# Patient Record
Sex: FEMALE | Race: WHITE | HISPANIC OR LATINO | Employment: UNEMPLOYED | URBAN - METROPOLITAN AREA
[De-identification: names, ages, dates, MRNs, and addresses within clinical notes are randomized per-mention and may not be internally consistent; named-entity substitution may affect disease eponyms.]

---

## 2018-12-16 ENCOUNTER — HOSPITAL ENCOUNTER (OUTPATIENT)
Facility: MEDICAL CENTER | Age: 23
End: 2018-12-18
Attending: EMERGENCY MEDICINE | Admitting: HOSPITALIST
Payer: COMMERCIAL

## 2018-12-16 DIAGNOSIS — R45.851 SUICIDAL IDEATION: ICD-10-CM

## 2018-12-16 DIAGNOSIS — T07.XXXA MULTIPLE LACERATIONS: ICD-10-CM

## 2018-12-16 DIAGNOSIS — F50.00 ANOREXIA NERVOSA: ICD-10-CM

## 2018-12-16 DIAGNOSIS — T14.91XA SUICIDE ATTEMPT (HCC): ICD-10-CM

## 2018-12-16 DIAGNOSIS — S41.112A LACERATION OF MULTIPLE SITES OF LEFT UPPER EXTREMITY, INITIAL ENCOUNTER: ICD-10-CM

## 2018-12-16 LAB
ALBUMIN SERPL BCP-MCNC: 4.4 G/DL (ref 3.2–4.9)
ALBUMIN/GLOB SERPL: 1.9 G/DL
ALP SERPL-CCNC: 68 U/L (ref 30–99)
ALT SERPL-CCNC: 8 U/L (ref 2–50)
ANION GAP SERPL CALC-SCNC: 7 MMOL/L (ref 0–11.9)
APAP SERPL-MCNC: <10 UG/ML (ref 10–30)
AST SERPL-CCNC: 15 U/L (ref 12–45)
BASOPHILS # BLD AUTO: 0.6 % (ref 0–1.8)
BASOPHILS # BLD: 0.05 K/UL (ref 0–0.12)
BILIRUB SERPL-MCNC: 0.4 MG/DL (ref 0.1–1.5)
BUN SERPL-MCNC: 14 MG/DL (ref 8–22)
CALCIUM SERPL-MCNC: 8.8 MG/DL (ref 8.5–10.5)
CHLORIDE SERPL-SCNC: 108 MMOL/L (ref 96–112)
CO2 SERPL-SCNC: 23 MMOL/L (ref 20–33)
CREAT SERPL-MCNC: 0.74 MG/DL (ref 0.5–1.4)
EOSINOPHIL # BLD AUTO: 0.07 K/UL (ref 0–0.51)
EOSINOPHIL NFR BLD: 0.9 % (ref 0–6.9)
ERYTHROCYTE [DISTWIDTH] IN BLOOD BY AUTOMATED COUNT: 40.6 FL (ref 35.9–50)
ETHANOL BLD-MCNC: 0 G/DL
GLOBULIN SER CALC-MCNC: 2.3 G/DL (ref 1.9–3.5)
GLUCOSE SERPL-MCNC: 83 MG/DL (ref 65–99)
HCG SERPL QL: NEGATIVE
HCT VFR BLD AUTO: 35.9 % (ref 37–47)
HGB BLD-MCNC: 11.9 G/DL (ref 12–16)
IMM GRANULOCYTES # BLD AUTO: 0.01 K/UL (ref 0–0.11)
IMM GRANULOCYTES NFR BLD AUTO: 0.1 % (ref 0–0.9)
LYMPHOCYTES # BLD AUTO: 2.28 K/UL (ref 1–4.8)
LYMPHOCYTES NFR BLD: 28.2 % (ref 22–41)
MCH RBC QN AUTO: 28.8 PG (ref 27–33)
MCHC RBC AUTO-ENTMCNC: 33.1 G/DL (ref 33.6–35)
MCV RBC AUTO: 86.9 FL (ref 81.4–97.8)
MONOCYTES # BLD AUTO: 0.48 K/UL (ref 0–0.85)
MONOCYTES NFR BLD AUTO: 5.9 % (ref 0–13.4)
NEUTROPHILS # BLD AUTO: 5.19 K/UL (ref 2–7.15)
NEUTROPHILS NFR BLD: 64.3 % (ref 44–72)
NRBC # BLD AUTO: 0 K/UL
NRBC BLD-RTO: 0 /100 WBC
PLATELET # BLD AUTO: 231 K/UL (ref 164–446)
PMV BLD AUTO: 10.6 FL (ref 9–12.9)
POTASSIUM SERPL-SCNC: 3.4 MMOL/L (ref 3.6–5.5)
PROT SERPL-MCNC: 6.7 G/DL (ref 6–8.2)
RBC # BLD AUTO: 4.13 M/UL (ref 4.2–5.4)
SALICYLATES SERPL-MCNC: 0 MG/DL (ref 15–25)
SODIUM SERPL-SCNC: 138 MMOL/L (ref 135–145)
WBC # BLD AUTO: 8.1 K/UL (ref 4.8–10.8)

## 2018-12-16 PROCEDURE — 84703 CHORIONIC GONADOTROPIN ASSAY: CPT

## 2018-12-16 PROCEDURE — 90471 IMMUNIZATION ADMIN: CPT

## 2018-12-16 PROCEDURE — 80307 DRUG TEST PRSMV CHEM ANLYZR: CPT

## 2018-12-16 PROCEDURE — 80053 COMPREHEN METABOLIC PANEL: CPT

## 2018-12-16 PROCEDURE — 85025 COMPLETE CBC W/AUTO DIFF WBC: CPT

## 2018-12-16 PROCEDURE — 304217 HCHG IRRIGATION SYSTEM

## 2018-12-16 PROCEDURE — 96372 THER/PROPH/DIAG INJ SC/IM: CPT

## 2018-12-16 PROCEDURE — 99285 EMERGENCY DEPT VISIT HI MDM: CPT

## 2018-12-16 PROCEDURE — 700111 HCHG RX REV CODE 636 W/ 250 OVERRIDE (IP): Performed by: EMERGENCY MEDICINE

## 2018-12-16 PROCEDURE — 93005 ELECTROCARDIOGRAM TRACING: CPT | Performed by: EMERGENCY MEDICINE

## 2018-12-16 PROCEDURE — 700101 HCHG RX REV CODE 250: Performed by: EMERGENCY MEDICINE

## 2018-12-16 PROCEDURE — 90715 TDAP VACCINE 7 YRS/> IM: CPT | Performed by: EMERGENCY MEDICINE

## 2018-12-16 PROCEDURE — 36415 COLL VENOUS BLD VENIPUNCTURE: CPT

## 2018-12-16 RX ORDER — LIDOCAINE HYDROCHLORIDE AND EPINEPHRINE 10; 10 MG/ML; UG/ML
30 INJECTION, SOLUTION INFILTRATION; PERINEURAL ONCE
Status: COMPLETED | OUTPATIENT
Start: 2018-12-16 | End: 2018-12-16

## 2018-12-16 RX ORDER — HALOPERIDOL 5 MG/ML
5 INJECTION INTRAMUSCULAR ONCE
Status: COMPLETED | OUTPATIENT
Start: 2018-12-16 | End: 2018-12-16

## 2018-12-16 RX ADMIN — HALOPERIDOL LACTATE 5 MG: 5 INJECTION, SOLUTION INTRAMUSCULAR at 23:10

## 2018-12-16 RX ADMIN — CLOSTRIDIUM TETANI TOXOID ANTIGEN (FORMALDEHYDE INACTIVATED), CORYNEBACTERIUM DIPHTHERIAE TOXOID ANTIGEN (FORMALDEHYDE INACTIVATED), BORDETELLA PERTUSSIS TOXOID ANTIGEN (GLUTARALDEHYDE INACTIVATED), BORDETELLA PERTUSSIS FILAMENTOUS HEMAGGLUTININ ANTIGEN (FORMALDEHYDE INACTIVATED), BORDETELLA PERTUSSIS PERTACTIN ANTIGEN, AND BORDETELLA PERTUSSIS FIMBRIAE 2/3 ANTIGEN 0.5 ML: 5; 2; 2.5; 5; 3; 5 INJECTION, SUSPENSION INTRAMUSCULAR at 23:09

## 2018-12-16 RX ADMIN — LIDOCAINE HYDROCHLORIDE AND EPINEPHRINE 30 ML: 10; 10 INJECTION, SOLUTION INFILTRATION; PERINEURAL at 23:10

## 2018-12-16 ASSESSMENT — PAIN SCALES - GENERAL: PAINLEVEL_OUTOF10: 4

## 2018-12-17 LAB
AMPHET UR QL SCN: NEGATIVE
BARBITURATES UR QL SCN: NEGATIVE
BENZODIAZ UR QL SCN: POSITIVE
BZE UR QL SCN: NEGATIVE
CANNABINOIDS UR QL SCN: POSITIVE
METHADONE UR QL SCN: NEGATIVE
OPIATES UR QL SCN: NEGATIVE
OXYCODONE UR QL SCN: NEGATIVE
PCP UR QL SCN: NEGATIVE
PROPOXYPH UR QL SCN: NEGATIVE

## 2018-12-17 PROCEDURE — 700101 HCHG RX REV CODE 250: Performed by: EMERGENCY MEDICINE

## 2018-12-17 PROCEDURE — 700102 HCHG RX REV CODE 250 W/ 637 OVERRIDE(OP): Performed by: EMERGENCY MEDICINE

## 2018-12-17 PROCEDURE — A9270 NON-COVERED ITEM OR SERVICE: HCPCS | Performed by: EMERGENCY MEDICINE

## 2018-12-17 PROCEDURE — 90791 PSYCH DIAGNOSTIC EVALUATION: CPT

## 2018-12-17 PROCEDURE — 305000 HCHG REPAIR-SIMPLE/INTERMED LEVEL 2

## 2018-12-17 PROCEDURE — 80307 DRUG TEST PRSMV CHEM ANLYZR: CPT

## 2018-12-17 PROCEDURE — 36415 COLL VENOUS BLD VENIPUNCTURE: CPT

## 2018-12-17 PROCEDURE — 303747 HCHG EXTRA SUTURE

## 2018-12-17 RX ORDER — GABAPENTIN 100 MG/1
100 CAPSULE ORAL 3 TIMES DAILY
COMMUNITY

## 2018-12-17 RX ORDER — GABAPENTIN 100 MG/1
100 CAPSULE ORAL 3 TIMES DAILY
Status: DISCONTINUED | OUTPATIENT
Start: 2018-12-17 | End: 2018-12-18

## 2018-12-17 RX ORDER — TRAZODONE HYDROCHLORIDE 100 MG/1
100 TABLET ORAL NIGHTLY
COMMUNITY

## 2018-12-17 RX ORDER — GABAPENTIN 300 MG/1
600 CAPSULE ORAL
Status: DISCONTINUED | OUTPATIENT
Start: 2018-12-17 | End: 2018-12-18 | Stop reason: HOSPADM

## 2018-12-17 RX ORDER — CLONAZEPAM 0.5 MG/1
1 TABLET ORAL 2 TIMES DAILY PRN
Status: DISCONTINUED | OUTPATIENT
Start: 2018-12-17 | End: 2018-12-18 | Stop reason: HOSPADM

## 2018-12-17 RX ORDER — TRAZODONE HYDROCHLORIDE 50 MG/1
100 TABLET ORAL NIGHTLY
Status: DISCONTINUED | OUTPATIENT
Start: 2018-12-17 | End: 2018-12-18 | Stop reason: HOSPADM

## 2018-12-17 RX ORDER — FLUOXETINE HYDROCHLORIDE 20 MG/1
60 CAPSULE ORAL DAILY
Status: DISCONTINUED | OUTPATIENT
Start: 2018-12-17 | End: 2018-12-18 | Stop reason: HOSPADM

## 2018-12-17 RX ORDER — LURASIDONE HYDROCHLORIDE 40 MG/1
40 TABLET, FILM COATED ORAL
COMMUNITY

## 2018-12-17 RX ORDER — LAMOTRIGINE 100 MG/1
200 TABLET ORAL
Status: DISCONTINUED | OUTPATIENT
Start: 2018-12-17 | End: 2018-12-18 | Stop reason: HOSPADM

## 2018-12-17 RX ORDER — LURASIDONE HYDROCHLORIDE 40 MG/1
40 TABLET, FILM COATED ORAL
Status: DISCONTINUED | OUTPATIENT
Start: 2018-12-17 | End: 2018-12-18 | Stop reason: HOSPADM

## 2018-12-17 RX ORDER — LAMOTRIGINE 100 MG/1
200 TABLET ORAL
COMMUNITY

## 2018-12-17 RX ORDER — LIDOCAINE HYDROCHLORIDE AND EPINEPHRINE 10; 10 MG/ML; UG/ML
30 INJECTION, SOLUTION INFILTRATION; PERINEURAL ONCE
Status: COMPLETED | OUTPATIENT
Start: 2018-12-17 | End: 2018-12-17

## 2018-12-17 RX ORDER — IBUPROFEN 200 MG
400 TABLET ORAL EVERY 6 HOURS PRN
Status: DISCONTINUED | OUTPATIENT
Start: 2018-12-17 | End: 2018-12-18 | Stop reason: HOSPADM

## 2018-12-17 RX ORDER — IBUPROFEN 200 MG
400 TABLET ORAL EVERY 6 HOURS PRN
COMMUNITY

## 2018-12-17 RX ORDER — CLONAZEPAM 1 MG/1
1 TABLET ORAL 2 TIMES DAILY PRN
COMMUNITY

## 2018-12-17 RX ORDER — FLUOXETINE HYDROCHLORIDE 20 MG/1
60 CAPSULE ORAL DAILY
COMMUNITY

## 2018-12-17 RX ORDER — PRAZOSIN HYDROCHLORIDE 1 MG/1
1 CAPSULE ORAL NIGHTLY
COMMUNITY

## 2018-12-17 RX ORDER — PRAZOSIN HYDROCHLORIDE 1 MG/1
1 CAPSULE ORAL
Status: DISCONTINUED | OUTPATIENT
Start: 2018-12-17 | End: 2018-12-18 | Stop reason: HOSPADM

## 2018-12-17 RX ORDER — GABAPENTIN 600 MG/1
600 TABLET ORAL
COMMUNITY

## 2018-12-17 RX ADMIN — LURASIDONE HYDROCHLORIDE 40 MG: 40 TABLET, FILM COATED ORAL at 18:22

## 2018-12-17 RX ADMIN — LIDOCAINE HYDROCHLORIDE AND EPINEPHRINE 30 ML: 10; 10 INJECTION, SOLUTION INFILTRATION; PERINEURAL at 01:45

## 2018-12-17 RX ADMIN — GABAPENTIN 100 MG: 100 CAPSULE ORAL at 18:22

## 2018-12-17 RX ADMIN — LAMOTRIGINE 200 MG: 100 TABLET ORAL at 21:10

## 2018-12-17 RX ADMIN — IBUPROFEN 400 MG: 200 TABLET, FILM COATED ORAL at 18:27

## 2018-12-17 RX ADMIN — GABAPENTIN 600 MG: 300 CAPSULE ORAL at 21:11

## 2018-12-17 RX ADMIN — TRAZODONE HYDROCHLORIDE 100 MG: 50 TABLET ORAL at 21:10

## 2018-12-17 RX ADMIN — PRAZOSIN HYDROCHLORIDE 1 MG: 1 CAPSULE ORAL at 21:10

## 2018-12-17 RX ADMIN — FLUOXETINE HYDROCHLORIDE 60 MG: 20 CAPSULE ORAL at 18:22
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
## 2018-12-17 NOTE — ED NOTES
Report given to MARY Ponce. Restraints removed by security. Pt resting comfortably and moved to bed 34

## 2018-12-17 NOTE — ED NOTES
Patient resting quietly in bed with eyes closed without distress, no apparent needs at this time.  Good chest rise and fall.  Sitter outside of room watching patient

## 2018-12-17 NOTE — ED NOTES
Total of 9 belongings bags. Place in right upper side on top of lockers. All patients person items will not fit in a locker.

## 2018-12-17 NOTE — ED NOTES
Patient moved to [place] [id number] at this time.  Report received from Shanelle HERNANDEZ.  Assuming care at this time.  Patient removed from final restraint.

## 2018-12-17 NOTE — ED NOTES
Received report and assumed care of pt. Pt sleeping at this time. 1:1 sitter monitoring pt directly for safety.
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
## 2018-12-17 NOTE — ED NOTES
Pt restraints were ordered upon arrival and placed at 23:00 per Doctor. Pt was monitored per flowchart from start to discontinue of restraints. Initially, pt started with two points but when  and tech took the pt's pants off the ankle restraint was never put back on. Pt tolerated wrist restraint but was intermittently agitated throughout the entire time of her structures being put in to the multiple lacerations she had. Restraint were eventually taken off by security after pt assured us she would not try to hurt herself.

## 2018-12-17 NOTE — ED NOTES
Med rec complated per pt's papers from [facility]  Pt states that she has not taken her medications in 2 days  Allergies reviewed  No PO antibiotics in the last 30 days

## 2018-12-18 VITALS
SYSTOLIC BLOOD PRESSURE: 92 MMHG | TEMPERATURE: 98 F | OXYGEN SATURATION: 96 % | RESPIRATION RATE: 16 BRPM | BODY MASS INDEX: 17.52 KG/M2 | HEART RATE: 75 BPM | DIASTOLIC BLOOD PRESSURE: 55 MMHG | WEIGHT: 109 LBS | HEIGHT: 66 IN

## 2018-12-18 PROBLEM — D50.0 NORMOCYTIC ANEMIA DUE TO BLOOD LOSS: Status: ACTIVE | Noted: 2018-12-18

## 2018-12-18 PROBLEM — D64.9 ANEMIA: Status: ACTIVE | Noted: 2018-12-18

## 2018-12-18 PROBLEM — F60.9 PERSONALITY DISORDER (HCC): Status: ACTIVE | Noted: 2018-12-18

## 2018-12-18 PROBLEM — F50.00 ANOREXIA NERVOSA: Status: ACTIVE | Noted: 2018-12-18

## 2018-12-18 PROBLEM — R45.851 SUICIDAL IDEATION: Status: ACTIVE | Noted: 2018-12-18

## 2018-12-18 PROBLEM — T14.8XXA SUPERFICIAL LACERATION: Status: ACTIVE | Noted: 2018-12-18

## 2018-12-18 LAB — EKG IMPRESSION: NORMAL

## 2018-12-18 PROCEDURE — 99203 OFFICE O/P NEW LOW 30 MIN: CPT | Performed by: HOSPITALIST

## 2018-12-18 PROCEDURE — G0378 HOSPITAL OBSERVATION PER HR: HCPCS

## 2018-12-18 PROCEDURE — A9270 NON-COVERED ITEM OR SERVICE: HCPCS | Performed by: EMERGENCY MEDICINE

## 2018-12-18 PROCEDURE — 99214 OFFICE O/P EST MOD 30 MIN: CPT | Performed by: PSYCHIATRY & NEUROLOGY

## 2018-12-18 PROCEDURE — 700102 HCHG RX REV CODE 250 W/ 637 OVERRIDE(OP): Performed by: EMERGENCY MEDICINE

## 2018-12-18 RX ORDER — BISACODYL 10 MG
10 SUPPOSITORY, RECTAL RECTAL
Status: DISCONTINUED | OUTPATIENT
Start: 2018-12-18 | End: 2018-12-18 | Stop reason: HOSPADM

## 2018-12-18 RX ORDER — AMOXICILLIN 250 MG
2 CAPSULE ORAL 2 TIMES DAILY
Status: DISCONTINUED | OUTPATIENT
Start: 2018-12-18 | End: 2018-12-18 | Stop reason: HOSPADM

## 2018-12-18 RX ORDER — DRONABINOL 2.5 MG/1
2.5 CAPSULE ORAL EVERY MORNING
Status: DISCONTINUED | OUTPATIENT
Start: 2018-12-18 | End: 2018-12-18 | Stop reason: HOSPADM

## 2018-12-18 RX ORDER — POLYETHYLENE GLYCOL 3350 17 G/17G
1 POWDER, FOR SOLUTION ORAL
Status: DISCONTINUED | OUTPATIENT
Start: 2018-12-18 | End: 2018-12-18 | Stop reason: HOSPADM

## 2018-12-18 RX ORDER — GABAPENTIN 100 MG/1
200 CAPSULE ORAL 3 TIMES DAILY
Status: DISCONTINUED | OUTPATIENT
Start: 2018-12-18 | End: 2018-12-18 | Stop reason: HOSPADM

## 2018-12-18 RX ORDER — NICOTINE 21 MG/24HR
21 PATCH, TRANSDERMAL 24 HOURS TRANSDERMAL
Status: DISCONTINUED | OUTPATIENT
Start: 2018-12-18 | End: 2018-12-18 | Stop reason: HOSPADM

## 2018-12-18 RX ADMIN — IBUPROFEN 400 MG: 200 TABLET, FILM COATED ORAL at 02:57

## 2018-12-18 RX ADMIN — FLUOXETINE HYDROCHLORIDE 60 MG: 20 CAPSULE ORAL at 05:30

## 2018-12-18 RX ADMIN — GABAPENTIN 100 MG: 100 CAPSULE ORAL at 02:57

## 2018-12-18 RX ADMIN — CLONAZEPAM 1 MG: 0.5 TABLET ORAL at 08:16

## 2018-12-18 ASSESSMENT — ENCOUNTER SYMPTOMS
COUGH: 0
SHORTNESS OF BREATH: 0
FALLS: 0
NAUSEA: 0
DEPRESSION: 1
MUSCULOSKELETAL NEGATIVE: 1
GASTROINTESTINAL NEGATIVE: 1
SPEECH CHANGE: 0
VOMITING: 0
FOCAL WEAKNESS: 0
CARDIOVASCULAR NEGATIVE: 1
PALPITATIONS: 0
FEVER: 0
BLOOD IN STOOL: 0
RESPIRATORY NEGATIVE: 1
ABDOMINAL PAIN: 0
HALLUCINATIONS: 0
DIZZINESS: 0
EYES NEGATIVE: 1
CONSTITUTIONAL NEGATIVE: 1
NERVOUS/ANXIOUS: 1
DIZZINESS: 1
CHILLS: 0
HEADACHES: 0
LOSS OF CONSCIOUSNESS: 0

## 2018-12-18 ASSESSMENT — LIFESTYLE VARIABLES: SUBSTANCE_ABUSE: 1

## 2018-12-18 ASSESSMENT — PAIN SCALES - GENERAL: PAINLEVEL_OUTOF10: 2

## 2018-12-18 NOTE — ED NOTES
Patient accepted at [facility], Dr. Neelima Owusu notified, admitting MD notified, ERP notified, admit discontinued, patient to [facility] with EMS.

## 2018-12-18 NOTE — DISCHARGE PLANNING
Alert Team  Requested PAR come complete pt registration, as facesheet is still empty other than name.

## 2018-12-18 NOTE — PROGRESS NOTES
BEHAVIORAL TREATMENT PLAN FOR HUY SILVER:       PHONE:   -May use phone 3 times a day SUPERVISED  -If she becomes agitated, upset, or anything that is deemed to be inappropriate or determinantal, the phone should be terminated.    BATHROOM:  -sitter will be in room at all times until otherwise determined by psychiatry.  -sitter will have visual contact with pt even while pt is in the bathroom.    REQUESTS:  -Pt may make requests one time an hour. This does not imply that requests will be granted or denied.  -nursing will do rounds as usual.   -discussions, questions, requests can be made once. After an answer has been given, nursing is not to continue further discussion on  that subject. The question, request etc can be asked again on the next round and will addressed: only once. After that that topic,  question etc will not be addressed again in that visit.    EXERCISE:  -sitter to accompany pt on walks on the unit ONLY. May have 2 walks a day guaranteed.  -more than 2 walks a day are at nursing/staff availability/discretion only.  -no power walking.    FOOD:  -a nutritional consult will be ordered to identify patient's food preferences  -pt can eat what she wants and how much or how little she wants.  -a BMP will be ordered routinely on Sundays and Thursdays. Pt may not refuse as these are necessary to ensure she does not medically compromise herself.  -pt may refuse vitals unless she shows clinical signs of hypotension and/or hypoglycemia. If she refuses, a blood sugar should be checked  with or without her consent  to ensure medical safety to ensure she does not compromise herself medically. If it is below 60, she may choose to eat or drink to correct it.  If she does not eat or drink, the treatment team and psychiatry should be notified to assess the need for NG tube placement.   -if she shows clinical signs of hypotension and/or hypoglycemia and her blood sugar is greater than 60, please notify the team  "to see if the BMP and any other tests should be ordered at that time. If they feel pt may be at risk of  iminent death and/or disability and testing is required to ensure that she is not at risk of  iminent death and/or disability, they are to be done with or without her consent.  -should the case arise that pt is medically compromising herself and that she is at imminent risk of death and/or disability (to be determined by the treatment team) an NG tube will be considered in consultation with psychiatry.        This treatment plan will be modified and updated as is appropriate with pt input as well. Changes will be noted in the chart under the title of \"BEHAVIORAL TREATMENT PLAN FOR HUY SILVER\".      A copy of this treatment plan will be given to the patient as will all updates.            "

## 2018-12-18 NOTE — DISCHARGE PLANNING
Medical Social Work    MSW received a call from Gayle with [facility] requesting a copy of pt's facesheet.  Requested information faxed to [facility].

## 2018-12-18 NOTE — ED NOTES
Patient sleeping in stretcher, arousable to speech. Respirations even and unlabored. Medicated as charted. Denies needs at this time. Sitter at bedside.

## 2018-12-18 NOTE — DISCHARGE PLANNING
Mana from [facility] called and they will admit Pt.  Dr. Egan will be admitting doctor.     PCS completed and faxed to [facility]  Transport time arranged for 1215    Transfer packet completed with Original Legal Hold placed inside.  RN updated on transfer and transfer time.    Mana at [facility] updated on Pt transport time of 1215

## 2018-12-18 NOTE — DISCHARGE PLANNING
KAYLAH contacted [facility] at [phone]  Authorization Number is [id number]    Authorization Number given to [facility] and [place] for possible admission.

## 2018-12-18 NOTE — ED NOTES
Patient's home medications have been reviewed by the pharmacy team.     No past medical history on file.    Patient's Medications   New Prescriptions    No medications on file   Previous Medications    CLONAZEPAM (KLONOPIN) 1 MG TAB    Take 1 mg by mouth 2 times a day as needed (anxiety).    FLUOXETINE (PROZAC) 20 MG CAP    Take 60 mg by mouth every day.    GABAPENTIN (NEURONTIN) 100 MG CAP    Take 100 mg by mouth 3 times a day.    GABAPENTIN (NEURONTIN) 600 MG TABLET    Take 600 mg by mouth every bedtime.    IBUPROFEN (MOTRIN) 200 MG TAB    Take 400 mg by mouth every 6 hours as needed.    LAMOTRIGINE (LAMICTAL) 100 MG TAB    Take 200 mg by mouth every bedtime.    LURASIDONE (LATUDA) 40 MG TAB    Take 40 mg by mouth with dinner.    MULTIVITAMIN (THERAGRAN) TAB    Take 1 Tab by mouth every day.    PRAZOSIN (MINIPRESS) 1 MG CAP    Take 1 mg by mouth every evening.    TRAZODONE (DESYREL) 100 MG TAB    Take 100 mg by mouth every evening.   Modified Medications    No medications on file   Discontinued Medications    No medications on file          A:  Medications do not appear to be contributing to current complaints.     P:    No recommendations at this time. Home medications have been reordered as appropriate.      Moi Giron, PharmD

## 2018-12-18 NOTE — ED PROVIDER NOTES
"ED Provider Note    Patient was turned over to me awaiting psychiatry transfer.  She is asking to be weighed but otherwise has no complaints.BP (!) 90/54   Pulse 73   Temp 36.8 °C (98.3 °F) (Temporal)   Resp 16   Ht 1.676 m (5' 6\")   Wt 49.4 kg (109 lb)   SpO2 96%   BMI 17.59 kg/m²   Psychiatry is at the bedside.  Disposition per Dr. Owusu.    I have subsequently discussed the patient's case with Dr. Owusu.  There is an issue of suicide ideation/self-harm.  A question of personality disorder.  This is on top of eating disorder and the patient already exhibiting evidence of decreased caloric intake.  The patient will need close monitoring of her weights, her intake, her vital signs, her BMPs.  We feel that this would best be served on an inpatient floor.  For this reason I discussed the patient's case with Dr. Moy; he will be admitting.    Further addendum: Apparently the patient has been accepted to [facility].  Nursing has discussed this with both Dr Owusu, psychiatry, and Dr Owusu, Dr Moy's partner and they have recommended to proceed with transfer.  "

## 2018-12-18 NOTE — ED NOTES
Patient requesting phone to talk to mother. Calm and cooperative with care throughout shift. Provided with phone at this time. Observer remains at doorway.

## 2018-12-18 NOTE — ED NOTES
[facility] [phone] would like to be notified of updates.

## 2018-12-18 NOTE — ED NOTES
Patient medicated for arm pain per PRN order. Calm and cooperative. Respirations even and unlabored. Sitter at bedside with patient.
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
## 2018-12-18 NOTE — DISCHARGE PLANNING
RN and Registration notified that Pt needs to be registered and a UDS needs to be obtained in order for Mental Health Referral to be sent.

## 2018-12-18 NOTE — ED NOTES
Patient sitting at calmly at doorway. Respirations even and unlabored. 1:1 sitter remains with patient.

## 2018-12-18 NOTE — ED NOTES
Patient refused breakfast, kept her orange juice, asking to use phone, asking for anxiety medication.

## 2018-12-18 NOTE — ED NOTES
Patient ambulatory with tech to ambulance bay for weighing. Calm and cooperative. Small ooze noted from neck sutures. Cleansed with NS and DSD paced. Continuing to monitor
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
## 2018-12-18 NOTE — ED PROVIDER NOTES
ED Provider Note    At 11:45 PM on 12/17/2018 the patient is awake and oriented coherent and rational.  Medically stable.  He is awaiting transfer to the psychiatric facility.  New Gary GANSERT MD

## 2018-12-18 NOTE — DISCHARGE PLANNING
Medical Social Work    Referral: Legal Hold    Intervention: Legal Hold Paperwork given to SW by [employer] RN Denise Giles    Legal Hold Initiated: Date: 12- Time: 2230    Patient’s Insurance Listed on Face Sheet: [employer]    Referrals sent to: [facility], [facility], [facility], [facility]    This referral contains the following information:  1) Face sheet _x___  2) Page 1 and Page 2 of Legal Hold __x__  3) Alert Team Assessment/Psych Assessment __x__  4) Head to toe physical exam _x___  5) Urine Drug Screen __pending_  6) Blood Alcohol __x__  7) Vital signs ___x_  8) Pregnancy test when applicable _x__  9) Medications list _x___    Plan: Patient will transfer to mental health facility once acceptance is obtained
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
## 2018-12-19 NOTE — ASSESSMENT & PLAN NOTE
Behavioral plan set by psych. Close supervision particularly around medications as well as eating and request for immediate restroom use.

## 2018-12-19 NOTE — ASSESSMENT & PLAN NOTE
Baseline hemoglobin unknown, at 11.9 on admission. May be multifactorial with iron deficiency but also in the setting of acute blood loss from self inflicted lacerations. No active bleeding  - repeat H/H in the coming week
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
